# Patient Record
Sex: MALE | Race: WHITE | NOT HISPANIC OR LATINO | Employment: UNEMPLOYED | URBAN - METROPOLITAN AREA
[De-identification: names, ages, dates, MRNs, and addresses within clinical notes are randomized per-mention and may not be internally consistent; named-entity substitution may affect disease eponyms.]

---

## 2019-08-13 RX ORDER — WARFARIN SODIUM 5 MG/1
5 TABLET ORAL DAILY
Qty: 120 TABLET | Refills: 0 | Status: SHIPPED | OUTPATIENT
Start: 2019-08-13 | End: 2019-09-17 | Stop reason: SDUPTHER

## 2019-08-13 RX ORDER — WARFARIN SODIUM 5 MG/1
5 TABLET ORAL DAILY
COMMUNITY
Start: 2019-08-13 | End: 2019-08-13 | Stop reason: SDUPTHER

## 2019-08-13 NOTE — TELEPHONE ENCOUNTER
----- Message from Anay Adkins sent at 8/13/2019  9:41 AM CDT -----  No. [phone]    Patient needs a refill on Warfarin 5mg, 1 1/2 per day, 90 day supply.  [facility] in [place]

## 2019-08-13 NOTE — TELEPHONE ENCOUNTER
Spoke to pt, he made appt for feb for his 6m c/u. Mailed appt reminder letter and med rec release. Pt requesting a 90 day of rx.    Ok to fill warfarin 5mg 1.5 po qd #120 x0 to [facility] in chart?

## 2019-08-13 NOTE — TELEPHONE ENCOUNTER
Lmovm, need to know if pt is planning to stay with Dr. Lion, if so he needs to make an appt before we can give refills.

## 2019-09-09 ENCOUNTER — TELEPHONE (OUTPATIENT)
Dept: FAMILY MEDICINE | Facility: CLINIC | Age: 64
End: 2019-09-09

## 2019-09-09 DIAGNOSIS — Z79.01 WARFARIN ANTICOAGULATION: ICD-10-CM

## 2019-09-09 DIAGNOSIS — Z51.81 ENCOUNTER FOR MEDICATION MONITORING: Primary | ICD-10-CM

## 2019-09-09 NOTE — TELEPHONE ENCOUNTER
Patient went and had his PT/INR from orders from [facility], I will try and call tomorrow to get those results from the old office. I put in new orders for [facility], for next time if you could sign off on those please.

## 2019-09-09 NOTE — TELEPHONE ENCOUNTER
----- Message from Lety Monreal sent at 9/9/2019  4:00 PM CDT -----  Contact: SELF [phone]  Patient would like to speak with you about getting test results. Please advise.

## 2019-09-17 RX ORDER — WARFARIN SODIUM 5 MG/1
5 TABLET ORAL DAILY
Qty: 120 TABLET | Refills: 3 | Status: SHIPPED | OUTPATIENT
Start: 2019-09-17

## 2019-09-17 NOTE — TELEPHONE ENCOUNTER
----- Message from Antonia Be sent at 9/17/2019  8:42 AM CDT -----  Contact: Self [phone]  Patient is calling to get refills on his medication sent to [facility] [address] [place] [address] [phone] (Phone)  [phone] (Fax)        1. warfarin (COUMADIN) 5 MG tablet 120 tablet 3 month supply

## 2019-10-23 ENCOUNTER — TELEPHONE (OUTPATIENT)
Dept: FAMILY MEDICINE | Facility: CLINIC | Age: 64
End: 2019-10-23

## 2019-10-23 DIAGNOSIS — Z51.81 ENCOUNTER FOR MONITORING COUMADIN THERAPY: Primary | ICD-10-CM

## 2019-10-23 DIAGNOSIS — Z79.01 ENCOUNTER FOR MONITORING COUMADIN THERAPY: Primary | ICD-10-CM

## 2019-10-23 NOTE — TELEPHONE ENCOUNTER
Pt goes to [facility] to get bw done. He needs his PT/INR done. Can you please put [facility].    No call back needed informed he can go this afternoon or [facility] to get it done

## 2019-10-23 NOTE — TELEPHONE ENCOUNTER
----- Message from Rupa Moore sent at 10/23/2019  7:40 AM CDT -----  Contact: patient  Patient called to speak with your office in regard to orders in place for him to get his blood thinner checked.    Please call [phone] to discuss today.

## 2019-10-26 LAB
INR PPP: 2
PROTHROMBIN TIME: 20.1 SEC (ref 9–11.5)

## 2019-10-28 ENCOUNTER — TELEPHONE (OUTPATIENT)
Dept: FAMILY MEDICINE | Facility: CLINIC | Age: 64
End: 2019-10-28

## 2019-10-28 NOTE — TELEPHONE ENCOUNTER
----- Message from Rupa Moore sent at 10/28/2019  2:58 PM CDT -----  Contact: patient  Type:  Patient Returning Call    Who Called: Rajesh  Who Left Message for Patient: Dr. Lion  Does the patient know what this is regarding?: no  Would the patient rather a call back or a response via [facility]?  Call back   Best Call Back Number: [phone]  Additional Information:  no

## 2020-01-30 ENCOUNTER — PATIENT OUTREACH (OUTPATIENT)
Dept: ADMINISTRATIVE | Facility: HOSPITAL | Age: 65
End: 2020-01-30

## 2020-01-30 ENCOUNTER — TELEPHONE (OUTPATIENT)
Dept: ADMINISTRATIVE | Facility: HOSPITAL | Age: 65
End: 2020-01-30

## 2020-01-30 DIAGNOSIS — F33.0 MILD RECURRENT MAJOR DEPRESSION: Primary | ICD-10-CM

## 2020-01-30 RX ORDER — ESCITALOPRAM OXALATE 5 MG/1
5 TABLET ORAL DAILY
Qty: 90 TABLET | Refills: 3 | Status: SHIPPED | OUTPATIENT
Start: 2020-01-30 | End: 2020-12-30

## 2020-01-30 NOTE — TELEPHONE ENCOUNTER
Pt is requesting a refill for Lexapro 5mg sent to [facility] in [place].   States he has 2 pills left, pt has upcoming apt on 02/13/2020.     Pharmacy is   [place] [address] [place] [address]   Phone number is [phone]

## 2020-03-12 ENCOUNTER — OFFICE VISIT (OUTPATIENT)
Dept: FAMILY MEDICINE | Facility: CLINIC | Age: 65
End: 2020-03-12
Payer: COMMERCIAL

## 2020-03-12 VITALS
SYSTOLIC BLOOD PRESSURE: 138 MMHG | DIASTOLIC BLOOD PRESSURE: 84 MMHG | TEMPERATURE: 98 F | HEART RATE: 65 BPM | HEIGHT: 75 IN | WEIGHT: 303.38 LBS | BODY MASS INDEX: 37.72 KG/M2 | OXYGEN SATURATION: 96 %

## 2020-03-12 DIAGNOSIS — I48.0 PAROXYSMAL ATRIAL FIBRILLATION: Primary | ICD-10-CM

## 2020-03-12 DIAGNOSIS — Z00.00 ANNUAL PHYSICAL EXAM: ICD-10-CM

## 2020-03-12 DIAGNOSIS — F33.0 MILD RECURRENT MAJOR DEPRESSION: ICD-10-CM

## 2020-03-12 PROCEDURE — 99396 PR PREVENTIVE VISIT,EST,40-64: ICD-10-PCS | Mod: S$GLB,,, | Performed by: INTERNAL MEDICINE

## 2020-03-12 PROCEDURE — 99999 PR PBB SHADOW E&M-EST. PATIENT-LVL III: CPT | Mod: PBBFAC,,, | Performed by: INTERNAL MEDICINE

## 2020-03-12 PROCEDURE — 99396 PREV VISIT EST AGE 40-64: CPT | Mod: S$GLB,,, | Performed by: INTERNAL MEDICINE

## 2020-03-12 PROCEDURE — 99999 PR PBB SHADOW E&M-EST. PATIENT-LVL III: ICD-10-PCS | Mod: PBBFAC,,, | Performed by: INTERNAL MEDICINE

## 2020-03-12 NOTE — PROGRESS NOTES
[facility] Note    Chief Complaint      Chief Complaint   Patient presents with    Annual Exam       History of Present Illness      Rajesh Mead is a 65 y.o. male with chronic conditions of A fib, depression, ULISSES who presents today for: re-establish care from  and annual preventative visit.  A fib, paroxysmal: Does not follow with cardiology.  On coumadin for anticoagulation.  Last INR 10/2019 at goal range.  Denies chest pain, shortness of breath, palpitations.  Depression: Controlled on lexapro.  Doing well on this regimen.  ULISSES: Compliant with CPAP.  Improves energy levels and sleep quality.  Diet: Prepares own food mostly.  Limiting fatty foods and carbs.  Drinks plenty water.  Exercise: Stays active.  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    Denies drinking and driving, drinking more than 4 drinks on occasion, drug use.     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  Td thinks UTD, not sure the date.  Shingles vaccine discussed.  Pneumonia vaccine due age 67.  Cscope 6-8 yrs ago, in [place], pt will try to find name of doctor to request report.      Past Medical History:  History reviewed. No pertinent past medical history.    Past Surgical History:   has a past surgical history that includes Tonsillectomy and Incision of uvula.    Family History:  family history includes No Known Problems in his father and mother.     Social History:  Social History     Tobacco Use    Smoking status: Never Smoker   Substance Use Topics    Alcohol use: Not Currently    Drug use: Not on file       Review of Systems   Constitutional: Negative for chills, fever and malaise/fatigue.   Respiratory: Negative for shortness of breath.    Cardiovascular: Negative for chest pain.   Gastrointestinal: Negative for constipation, diarrhea, nausea and vomiting.   Skin: Negative for rash.   Neurological: Negative for weakness.        Medications:  Outpatient Encounter Medications as of 3/12/2020  "  Medication Sig Dispense Refill    escitalopram oxalate (LEXAPRO) 5 MG Tab Take 1 tablet (5 mg total) by mouth once daily. 90 tablet 3    warfarin (COUMADIN) 5 MG tablet Take 1 tablet (5 mg total) by mouth Daily. Take 1.5 tab po qd. 120 tablet 3     No facility-administered encounter medications on file as of 3/12/2020.        Allergies:  Review of patient's allergies indicates:   Allergen Reactions    Penicillins        Health Maintenance:    There is no immunization history on file for this patient.   Health Maintenance   Topic Date Due    Hepatitis C Screening  1955    Lipid Panel  1955    TETANUS VACCINE  02/23/1975    Colonoscopy  02/23/2007        Physical Exam      Vital Signs  Temp: 98 °F (36.7 °C)  Temp src: Oral  Pulse: 65  SpO2: 96 %  BP: 138/84  BP Location: Right arm  Patient Position: Sitting  Pain Score: 0-No pain  Height and Weight  Height: 6' 3" (190.5 cm)  Weight: (!) 137.6 kg (303 lb 5.7 oz)  BSA (Calculated - sq m): 2.7 sq meters  BMI (Calculated): 37.9  Weight in (lb) to have BMI = 25: 199.6]    Physical Exam   Constitutional: He appears well-developed and well-nourished.   HENT:   Head: Normocephalic and atraumatic.   Right Ear: External ear normal.   Left Ear: External ear normal.   Mouth/Throat: Oropharynx is clear and moist.   Eyes: Pupils are equal, round, and reactive to light. Conjunctivae and EOM are normal.   Cardiovascular: Normal rate, regular rhythm, normal heart sounds and intact distal pulses.   No murmur heard.  Pulmonary/Chest: Effort normal and breath sounds normal. He has no wheezes. He has no rales.   Abdominal: Soft. Bowel sounds are normal. He exhibits no distension and no abdominal bruit. There is no hepatosplenomegaly. There is no tenderness.   Vitals reviewed.       Laboratory:  CBC:      CMP:        Invalid input(s): CREATININ  URINALYSIS:       LIPIDS:      TSH:      A1C:        Assessment/Plan     Rajesh Mead is a 65 y.o.male with:    1. Annual " physical exam  - CBC auto differential; Future  - Comprehensive metabolic panel; Future  - Lipid panel; Future  - TSH; Future  - T4, free; Future  - PSA, Screening; Future  - CBC auto differential  - Comprehensive metabolic panel  - Lipid panel  - TSH  - T4, free  - PSA, Screening  Discussed diet and exercise, vaccines and cancer screening, risk factors.  Screening labs ordered.     2. Paroxysmal atrial fibrillation  - CBC auto differential; Future  - Comprehensive metabolic panel; Future  - Lipid panel; Future  - TSH; Future  - T4, free; Future  - PSA, Screening; Future  - CBC auto differential  - Comprehensive metabolic panel  - Lipid panel  - TSH  - T4, free  - PSA, Screening  Continue current meds.  Updating INR.      3. Mild recurrent major depression  - CBC auto differential; Future  - Comprehensive metabolic panel; Future  - Lipid panel; Future  - TSH; Future  - T4, free; Future  - PSA, Screening; Future  - CBC auto differential  - Comprehensive metabolic panel  - Lipid panel  - TSH  - T4, free  - PSA, Screening   Continue current meds.      Chronic conditions status updated as per HPI.  Other than changes above, cont current medications and maintain follow up with specialists.  Return to clinic in 12 months.    Andrew Lion MD  [facility]

## 2020-05-08 DIAGNOSIS — Z12.11 COLON CANCER SCREENING: ICD-10-CM

## 2020-12-29 DIAGNOSIS — F33.0 MILD RECURRENT MAJOR DEPRESSION: ICD-10-CM

## 2020-12-30 RX ORDER — ESCITALOPRAM OXALATE 5 MG/1
TABLET ORAL
Qty: 90 TABLET | Refills: 1 | Status: SHIPPED | OUTPATIENT
Start: 2020-12-30 | End: 2021-05-26

## 2020-12-30 NOTE — PROGRESS NOTES
Refill Routing Note   Medication(s) are not appropriate for processing by [facility] for the following reason(s):     - Drug-Drug Interaction (warfarin and escitalopram oxalate)  [facility] action(s):  Defer  Medication-related problems identified: Drug-drug interaction  Medication Therapy Plan: CDML. Drug-Drug: warfarin and escitalopram oxalate; Concurrent use of selected anticoagulants and SSRIs or SNRIs may increase the risk for bleeding.; Defer to PCP  Medication reconciliation completed: No   Automatic Epic Generated Protocol Data:        Requested Prescriptions   Pending Prescriptions Disp Refills    escitalopram oxalate (LEXAPRO) 5 MG Tab [Pharmacy Med Name: ESCITALOPRAM 5 MG TABLET] 90 tablet 1     Sig: TAKE 1 TABLET BY MOUTH EVERY DAY       SSRI Protocol Passed - 12/29/2020 12:39 AM        Passed - No positive pregnancy test in past 12 months         Passed - Visit with authorizing provider in past 12 months or upcoming 90 days        Psychiatry:  Antidepressants - SSRI Passed - 12/30/2020 10:46 AM        Passed - Patient is at least 18 years old        Passed - Office visit in past 12 months or future 90 days     Recent Outpatient Visits            9 months ago Paroxysmal atrial fibrillation    [facility] [address] Andrew Lion MD                          Appointments  past 12m or future 3m with PCP    Date Provider   Last Visit   3/12/2020 Andrew Lion MD   Next Visit   Visit date not found Andrew Lion MD   ED visits in past 90 days: 0        Note composed:10:49 AM 12/30/2020

## 2020-12-30 NOTE — PROGRESS NOTES
Encounter details (provider/department) have been updated by [facility] staff  As of this time Protocols and CDM: Does not populate or display   Updated: Department  Of note. CDM should display. medication Is delegated and encounter details have been updated  Will resend refill request encounter to P Centralized Refill Staff Pool.   [facility]   Note composed:10:45 AM 12/30/2020

## 2020-12-30 NOTE — TELEPHONE ENCOUNTER
Care Due:                  Date            Visit Type   Department     Provider  --------------------------------------------------------------------------------                                ESTABLISHED   DESC FAMILY  Last Visit: 03-      PATIENT      [facility]  Andrew Ackerman  Next Visit: None Scheduled  None         None Found                                                            Last  Test          Frequency    Reason                     Performed    Due Date  --------------------------------------------------------------------------------    Office Visit  12 months..  escitalopram.............  03- 03-    Powered by mSpot. Reference number: [id number]. 12/30/2020 10:46:21 AM   CST

## 2021-09-05 DIAGNOSIS — F33.0 MILD RECURRENT MAJOR DEPRESSION: ICD-10-CM

## 2021-09-07 RX ORDER — ESCITALOPRAM OXALATE 5 MG/1
TABLET ORAL
Qty: 30 TABLET | Refills: 0 | Status: SHIPPED | OUTPATIENT
Start: 2021-09-07 | End: 2021-10-04

## 2021-10-03 DIAGNOSIS — F33.0 MILD RECURRENT MAJOR DEPRESSION: ICD-10-CM

## 2021-10-05 RX ORDER — ESCITALOPRAM OXALATE 5 MG/1
TABLET ORAL
Qty: 90 TABLET | Refills: 3 | Status: SHIPPED | OUTPATIENT
Start: 2021-10-05 | End: 2022-10-07

## 2022-02-09 DIAGNOSIS — Z12.11 COLON CANCER SCREENING: ICD-10-CM
